# Patient Record
Sex: MALE | Race: BLACK OR AFRICAN AMERICAN | NOT HISPANIC OR LATINO | Employment: OTHER | URBAN - METROPOLITAN AREA
[De-identification: names, ages, dates, MRNs, and addresses within clinical notes are randomized per-mention and may not be internally consistent; named-entity substitution may affect disease eponyms.]

---

## 2017-02-23 PROBLEM — D63.8 ANEMIA OF CHRONIC DISEASE: Status: ACTIVE | Noted: 2017-02-23

## 2017-02-23 PROBLEM — I07.1 TRICUSPID REGURGITATION: Status: ACTIVE | Noted: 2017-02-23

## 2017-10-15 PROBLEM — D63.8 ANEMIA OF CHRONIC DISEASE: Chronic | Status: ACTIVE | Noted: 2017-02-23

## 2018-02-22 PROBLEM — Z87.442 HISTORY OF KIDNEY STONES: Status: ACTIVE | Noted: 2018-02-22

## 2018-07-25 ENCOUNTER — TELEPHONE (OUTPATIENT)
Dept: ADMINISTRATIVE | Facility: HOSPITAL | Age: 73
End: 2018-07-25

## 2019-05-14 PROBLEM — D69.6 THROMBOCYTOPENIA: Status: ACTIVE | Noted: 2019-05-14

## 2020-11-25 PROBLEM — I34.0 NON-RHEUMATIC MITRAL REGURGITATION: Status: ACTIVE | Noted: 2020-11-25

## 2020-11-25 PROBLEM — I27.20 PULMONARY HYPERTENSION: Status: ACTIVE | Noted: 2020-11-25

## 2020-11-25 PROBLEM — I36.1 NON-RHEUMATIC TRICUSPID VALVE INSUFFICIENCY: Status: ACTIVE | Noted: 2020-11-25

## 2020-11-25 PROBLEM — I07.1 TRICUSPID REGURGITATION: Status: RESOLVED | Noted: 2017-02-23 | Resolved: 2020-11-25

## 2021-05-12 PROBLEM — D61.818 PANCYTOPENIA: Status: ACTIVE | Noted: 2021-05-12

## 2021-08-07 ENCOUNTER — HOSPITAL ENCOUNTER (EMERGENCY)
Facility: HOSPITAL | Age: 76
Discharge: HOME | End: 2021-08-07
Attending: EMERGENCY MEDICINE
Payer: MEDICARE

## 2021-08-07 VITALS
RESPIRATION RATE: 18 BRPM | OXYGEN SATURATION: 98 % | BODY MASS INDEX: 19.8 KG/M2 | SYSTOLIC BLOOD PRESSURE: 138 MMHG | DIASTOLIC BLOOD PRESSURE: 69 MMHG | TEMPERATURE: 98 F | HEIGHT: 71 IN | HEART RATE: 67 BPM

## 2021-08-07 DIAGNOSIS — I10 ESSENTIAL HYPERTENSION: Primary | ICD-10-CM

## 2021-08-07 PROCEDURE — 99281 EMR DPT VST MAYX REQ PHY/QHP: CPT

## 2021-10-20 PROBLEM — N22 CALCULUS OF URINARY TRACT IN DISEASES CLASSIFIED ELSEWHERE: Status: ACTIVE | Noted: 2021-10-20

## 2021-10-29 ENCOUNTER — HOSPITAL ENCOUNTER (EMERGENCY)
Facility: HOSPITAL | Age: 76
Discharge: HOME | End: 2021-10-29
Attending: EMERGENCY MEDICINE
Payer: MEDICARE

## 2021-10-29 VITALS
HEART RATE: 70 BPM | OXYGEN SATURATION: 100 % | WEIGHT: 142 LBS | BODY MASS INDEX: 19.8 KG/M2 | TEMPERATURE: 98 F | SYSTOLIC BLOOD PRESSURE: 160 MMHG | DIASTOLIC BLOOD PRESSURE: 80 MMHG | RESPIRATION RATE: 18 BRPM

## 2021-10-29 DIAGNOSIS — N39.0 URINARY TRACT INFECTION WITH HEMATURIA, SITE UNSPECIFIED: ICD-10-CM

## 2021-10-29 DIAGNOSIS — Z96.0 URETERAL STENT RETAINED: Primary | ICD-10-CM

## 2021-10-29 DIAGNOSIS — R31.9 URINARY TRACT INFECTION WITH HEMATURIA, SITE UNSPECIFIED: ICD-10-CM

## 2021-10-29 DIAGNOSIS — N32.89 BLADDER SPASMS: ICD-10-CM

## 2021-10-29 LAB
BACTERIA #/AREA URNS HPF: NEGATIVE /HPF
BILIRUB UR QL STRIP: ABNORMAL
CLARITY UR: ABNORMAL
COLOR UR: ABNORMAL
GLUCOSE UR QL STRIP: NEGATIVE
HGB UR QL STRIP: ABNORMAL
HYALINE CASTS #/AREA URNS LPF: 4 /LPF
KETONES UR QL STRIP: NEGATIVE
LEUKOCYTE ESTERASE UR QL STRIP: ABNORMAL
MICROSCOPIC COMMENT: ABNORMAL
NITRITE UR QL STRIP: POSITIVE
PH UR STRIP: 5 [PH] (ref 5–8)
PROT UR QL STRIP: ABNORMAL
RBC #/AREA URNS HPF: >100 /HPF (ref 0–4)
SP GR UR STRIP: 1.01 (ref 1–1.03)
SQUAMOUS #/AREA URNS HPF: 2 /HPF
URN SPEC COLLECT METH UR: ABNORMAL
UROBILINOGEN UR STRIP-ACNC: NEGATIVE EU/DL
WBC #/AREA URNS HPF: 15 /HPF (ref 0–5)

## 2021-10-29 PROCEDURE — 81000 URINALYSIS NONAUTO W/SCOPE: CPT | Performed by: CLINICAL NURSE SPECIALIST

## 2021-10-29 PROCEDURE — 25000003 PHARM REV CODE 250: Performed by: CLINICAL NURSE SPECIALIST

## 2021-10-29 PROCEDURE — 96372 THER/PROPH/DIAG INJ SC/IM: CPT

## 2021-10-29 PROCEDURE — 99284 EMERGENCY DEPT VISIT MOD MDM: CPT | Mod: 25

## 2021-10-29 PROCEDURE — 63600175 PHARM REV CODE 636 W HCPCS: Performed by: CLINICAL NURSE SPECIALIST

## 2021-10-29 PROCEDURE — 87086 URINE CULTURE/COLONY COUNT: CPT | Performed by: CLINICAL NURSE SPECIALIST

## 2021-10-29 RX ORDER — LEVOFLOXACIN 500 MG/1
500 TABLET, FILM COATED ORAL DAILY
Qty: 7 TABLET | Refills: 0 | Status: SHIPPED | OUTPATIENT
Start: 2021-10-29 | End: 2021-11-05

## 2021-10-29 RX ORDER — PHENAZOPYRIDINE HYDROCHLORIDE 200 MG/1
200 TABLET, FILM COATED ORAL 3 TIMES DAILY
Qty: 30 TABLET | Refills: 0 | Status: SHIPPED | OUTPATIENT
Start: 2021-10-29 | End: 2021-11-08

## 2021-10-29 RX ORDER — HYDROCODONE BITARTRATE AND ACETAMINOPHEN 7.5; 325 MG/1; MG/1
1 TABLET ORAL EVERY 6 HOURS PRN
Qty: 10 TABLET | Refills: 0 | Status: SHIPPED | OUTPATIENT
Start: 2021-10-29 | End: 2022-03-29

## 2021-10-29 RX ORDER — OXYCODONE AND ACETAMINOPHEN 5; 325 MG/1; MG/1
1 TABLET ORAL
Status: COMPLETED | OUTPATIENT
Start: 2021-10-29 | End: 2021-10-29

## 2021-10-29 RX ORDER — KETOROLAC TROMETHAMINE 30 MG/ML
30 INJECTION, SOLUTION INTRAMUSCULAR; INTRAVENOUS
Status: COMPLETED | OUTPATIENT
Start: 2021-10-29 | End: 2021-10-29

## 2021-10-29 RX ADMIN — KETOROLAC TROMETHAMINE 30 MG: 30 INJECTION, SOLUTION INTRAMUSCULAR at 11:10

## 2021-10-29 RX ADMIN — OXYCODONE HYDROCHLORIDE AND ACETAMINOPHEN 1 TABLET: 5; 325 TABLET ORAL at 11:10

## 2021-10-31 LAB — BACTERIA UR CULT: NORMAL

## 2021-11-03 PROBLEM — E86.0 ACUTE DEHYDRATION: Status: ACTIVE | Noted: 2021-11-03

## 2022-03-29 PROBLEM — I77.810 DILATED AORTIC ROOT: Status: ACTIVE | Noted: 2022-03-29

## 2022-03-29 PROBLEM — Z91.199 NONCOMPLIANCE: Status: ACTIVE | Noted: 2022-03-29

## 2022-07-06 PROBLEM — I51.7 LEFT ATRIAL ENLARGEMENT: Status: ACTIVE | Noted: 2022-07-06

## 2022-07-06 PROBLEM — J98.4 PULMONARY INSUFFICIENCY: Status: ACTIVE | Noted: 2022-07-06

## 2022-07-06 PROBLEM — I51.7 RIGHT ATRIAL ENLARGEMENT: Status: ACTIVE | Noted: 2022-07-06

## 2022-07-21 PROBLEM — Z00.00 WELLNESS EXAMINATION: Status: ACTIVE | Noted: 2022-07-21

## 2022-07-21 PROBLEM — C61 PROSTATE CANCER: Status: ACTIVE | Noted: 2022-07-21

## 2022-07-21 PROBLEM — E55.9 VITAMIN D DEFICIENCY: Status: ACTIVE | Noted: 2022-07-21

## 2022-07-22 ENCOUNTER — LAB VISIT (OUTPATIENT)
Dept: LAB | Facility: HOSPITAL | Age: 77
End: 2022-07-22
Attending: INTERNAL MEDICINE
Payer: MEDICARE

## 2022-07-22 DIAGNOSIS — N18.2 CHRONIC KIDNEY DISEASE, STAGE II (MILD): ICD-10-CM

## 2022-07-22 LAB
ALBUMIN SERPL BCP-MCNC: 3.4 G/DL (ref 3.5–5.2)
ANION GAP SERPL CALC-SCNC: 1 MMOL/L (ref 8–16)
BASOPHILS # BLD AUTO: 0.03 K/UL (ref 0–0.2)
BASOPHILS NFR BLD: 0.7 % (ref 0–1.9)
BUN SERPL-MCNC: 17 MG/DL (ref 8–23)
CALCIUM SERPL-MCNC: 8.5 MG/DL (ref 8.7–10.5)
CHLORIDE SERPL-SCNC: 113 MMOL/L (ref 95–110)
CO2 SERPL-SCNC: 30 MMOL/L (ref 23–29)
CREAT SERPL-MCNC: 1.4 MG/DL (ref 0.5–1.4)
CREAT UR-MCNC: 128 MG/DL (ref 23–375)
DIFFERENTIAL METHOD: ABNORMAL
EOSINOPHIL # BLD AUTO: 0.2 K/UL (ref 0–0.5)
EOSINOPHIL NFR BLD: 4.2 % (ref 0–8)
ERYTHROCYTE [DISTWIDTH] IN BLOOD BY AUTOMATED COUNT: 16.7 % (ref 11.5–14.5)
EST. GFR  (AFRICAN AMERICAN): 56.4 ML/MIN/1.73 M^2
EST. GFR  (NON AFRICAN AMERICAN): 48.8 ML/MIN/1.73 M^2
GLUCOSE SERPL-MCNC: 114 MG/DL (ref 70–110)
HCT VFR BLD AUTO: 35.8 % (ref 40–54)
HGB BLD-MCNC: 11.5 G/DL (ref 14–18)
IMM GRANULOCYTES # BLD AUTO: 0 K/UL (ref 0–0.04)
IMM GRANULOCYTES NFR BLD AUTO: 0 % (ref 0–0.5)
LYMPHOCYTES # BLD AUTO: 1 K/UL (ref 1–4.8)
LYMPHOCYTES NFR BLD: 23.7 % (ref 18–48)
MCH RBC QN AUTO: 26.1 PG (ref 27–31)
MCHC RBC AUTO-ENTMCNC: 32.1 G/DL (ref 32–36)
MCV RBC AUTO: 81 FL (ref 82–98)
MONOCYTES # BLD AUTO: 0.5 K/UL (ref 0.3–1)
MONOCYTES NFR BLD: 11.6 % (ref 4–15)
NEUTROPHILS # BLD AUTO: 2.6 K/UL (ref 1.8–7.7)
NEUTROPHILS NFR BLD: 59.8 % (ref 38–73)
NRBC BLD-RTO: 0 /100 WBC
PHOSPHATE SERPL-MCNC: 2.7 MG/DL (ref 2.7–4.5)
PLATELET # BLD AUTO: 118 K/UL (ref 150–450)
PMV BLD AUTO: 10.7 FL (ref 9.2–12.9)
POTASSIUM SERPL-SCNC: 3.8 MMOL/L (ref 3.5–5.1)
PROT UR-MCNC: 27.8 MG/DL (ref 0–15)
PROT/CREAT UR: 0.22 MG/G{CREAT} (ref 0–0.2)
PTH-INTACT SERPL-MCNC: 96.8 PG/ML (ref 9–77)
RBC # BLD AUTO: 4.4 M/UL (ref 4.6–6.2)
SODIUM SERPL-SCNC: 144 MMOL/L (ref 136–145)
WBC # BLD AUTO: 4.31 K/UL (ref 3.9–12.7)

## 2022-07-22 PROCEDURE — 83970 ASSAY OF PARATHORMONE: CPT | Performed by: INTERNAL MEDICINE

## 2022-07-22 PROCEDURE — 84156 ASSAY OF PROTEIN URINE: CPT | Performed by: INTERNAL MEDICINE

## 2022-07-22 PROCEDURE — 85025 COMPLETE CBC W/AUTO DIFF WBC: CPT | Performed by: INTERNAL MEDICINE

## 2022-07-22 PROCEDURE — 80069 RENAL FUNCTION PANEL: CPT | Performed by: INTERNAL MEDICINE

## 2022-07-22 PROCEDURE — 36415 COLL VENOUS BLD VENIPUNCTURE: CPT | Performed by: INTERNAL MEDICINE

## 2022-10-24 PROBLEM — Z00.00 WELLNESS EXAMINATION: Status: RESOLVED | Noted: 2022-07-21 | Resolved: 2022-10-24

## 2022-10-27 ENCOUNTER — HOSPITAL ENCOUNTER (OUTPATIENT)
Dept: RADIOLOGY | Facility: HOSPITAL | Age: 77
Discharge: HOME | End: 2022-10-27
Attending: PHYSICIAN ASSISTANT
Payer: MEDICARE

## 2022-10-27 DIAGNOSIS — R32 URINARY INCONTINENCE, UNSPECIFIED TYPE: ICD-10-CM

## 2022-10-27 DIAGNOSIS — Z87.448 HISTORY OF HEMATURIA: ICD-10-CM

## 2022-10-27 DIAGNOSIS — N20.0 NEPHROLITHIASIS: ICD-10-CM

## 2022-10-27 PROCEDURE — 74176 CT ABD & PELVIS W/O CONTRAST: CPT | Mod: TC

## 2023-01-23 ENCOUNTER — HOSPITAL ENCOUNTER (OUTPATIENT)
Dept: RADIOLOGY | Facility: HOSPITAL | Age: 78
Discharge: HOME | End: 2023-01-23
Attending: INTERNAL MEDICINE
Payer: MEDICARE

## 2023-01-23 DIAGNOSIS — R93.3 ABNORMAL CT SCAN, COLON: ICD-10-CM

## 2023-01-23 PROCEDURE — 74177 CT ABD & PELVIS W/CONTRAST: CPT | Mod: TC

## 2023-01-23 PROCEDURE — 25500020 PHARM REV CODE 255: Performed by: INTERNAL MEDICINE

## 2023-01-23 RX ADMIN — IOHEXOL 100 ML: 350 INJECTION, SOLUTION INTRAVENOUS at 08:01

## 2023-01-25 ENCOUNTER — HOSPITAL ENCOUNTER (EMERGENCY)
Facility: HOSPITAL | Age: 78
Discharge: HOME | End: 2023-01-25
Attending: STUDENT IN AN ORGANIZED HEALTH CARE EDUCATION/TRAINING PROGRAM
Payer: MEDICARE

## 2023-01-25 VITALS
WEIGHT: 132 LBS | DIASTOLIC BLOOD PRESSURE: 66 MMHG | SYSTOLIC BLOOD PRESSURE: 157 MMHG | RESPIRATION RATE: 16 BRPM | BODY MASS INDEX: 18.48 KG/M2 | TEMPERATURE: 98 F | OXYGEN SATURATION: 98 % | HEIGHT: 71 IN | HEART RATE: 69 BPM

## 2023-01-25 DIAGNOSIS — I10 HYPERTENSION, UNSPECIFIED TYPE: Primary | ICD-10-CM

## 2023-01-25 PROBLEM — R04.0 BLEEDING FROM THE NOSE: Status: ACTIVE | Noted: 2023-01-25

## 2023-01-25 PROBLEM — E21.3 HYPERPARATHYROIDISM: Status: ACTIVE | Noted: 2023-01-25

## 2023-01-25 PROCEDURE — 96372 THER/PROPH/DIAG INJ SC/IM: CPT | Performed by: STUDENT IN AN ORGANIZED HEALTH CARE EDUCATION/TRAINING PROGRAM

## 2023-01-25 PROCEDURE — 99284 EMERGENCY DEPT VISIT MOD MDM: CPT

## 2023-01-25 PROCEDURE — 63600175 PHARM REV CODE 636 W HCPCS: Performed by: STUDENT IN AN ORGANIZED HEALTH CARE EDUCATION/TRAINING PROGRAM

## 2023-01-25 RX ORDER — LIDOCAINE HYDROCHLORIDE 20 MG/ML
15 SOLUTION OROPHARYNGEAL ONCE
Status: DISCONTINUED | OUTPATIENT
Start: 2023-01-25 | End: 2023-01-25 | Stop reason: HOSPADM

## 2023-01-25 RX ORDER — HYDRALAZINE HYDROCHLORIDE 20 MG/ML
10 INJECTION INTRAMUSCULAR; INTRAVENOUS
Status: COMPLETED | OUTPATIENT
Start: 2023-01-25 | End: 2023-01-25

## 2023-01-25 RX ORDER — MAG HYDROX/ALUMINUM HYD/SIMETH 200-200-20
30 SUSPENSION, ORAL (FINAL DOSE FORM) ORAL ONCE
Status: DISCONTINUED | OUTPATIENT
Start: 2023-01-25 | End: 2023-01-25 | Stop reason: HOSPADM

## 2023-01-25 RX ADMIN — HYDRALAZINE HYDROCHLORIDE 10 MG: 20 INJECTION, SOLUTION INTRAMUSCULAR; INTRAVENOUS at 05:01
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
## 2023-04-27 PROBLEM — N39.3 MALE STRESS INCONTINENCE: Status: ACTIVE | Noted: 2023-04-27

## 2023-04-27 PROBLEM — N39.46 MIXED STRESS AND URGE URINARY INCONTINENCE: Status: ACTIVE | Noted: 2023-04-27

## 2023-04-27 PROBLEM — Z87.448 HISTORY OF HEMATURIA: Status: ACTIVE | Noted: 2023-04-27

## 2023-04-29 ENCOUNTER — HOSPITAL ENCOUNTER (EMERGENCY)
Facility: HOSPITAL | Age: 78
Discharge: HOME | End: 2023-04-30
Attending: STUDENT IN AN ORGANIZED HEALTH CARE EDUCATION/TRAINING PROGRAM
Payer: MEDICARE

## 2023-04-29 VITALS
HEART RATE: 71 BPM | WEIGHT: 126 LBS | HEIGHT: 71 IN | TEMPERATURE: 98 F | SYSTOLIC BLOOD PRESSURE: 152 MMHG | BODY MASS INDEX: 17.64 KG/M2 | DIASTOLIC BLOOD PRESSURE: 73 MMHG | RESPIRATION RATE: 20 BRPM | OXYGEN SATURATION: 95 %

## 2023-04-29 DIAGNOSIS — R09.81 NASAL CONGESTION: Primary | ICD-10-CM

## 2023-04-29 PROCEDURE — 99284 EMERGENCY DEPT VISIT MOD MDM: CPT

## 2023-04-30 RX ORDER — AZELASTINE 1 MG/ML
2 SPRAY, METERED NASAL 2 TIMES DAILY
Qty: 30 ML | Refills: 0 | Status: SHIPPED | OUTPATIENT
Start: 2023-04-30 | End: 2024-02-27
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
## 2023-07-10 PROBLEM — R06.09 DYSPNEA ON EXERTION: Status: ACTIVE | Noted: 2023-07-10

## 2023-07-27 ENCOUNTER — LAB VISIT (OUTPATIENT)
Dept: LAB | Facility: HOSPITAL | Age: 78
End: 2023-07-27
Attending: INTERNAL MEDICINE
Payer: MEDICARE

## 2023-07-27 DIAGNOSIS — N18.31 STAGE 3A CHRONIC KIDNEY DISEASE: ICD-10-CM

## 2023-07-27 DIAGNOSIS — D63.8 ANEMIA OF CHRONIC DISEASE: ICD-10-CM

## 2023-07-27 DIAGNOSIS — D75.89 BICYTOPENIA: ICD-10-CM

## 2023-07-27 DIAGNOSIS — D69.6 THROMBOCYTOPENIA: ICD-10-CM

## 2023-07-27 DIAGNOSIS — Z92.3 S/P RADIATION THERAPY > 12 WKS AGO: ICD-10-CM

## 2023-07-27 DIAGNOSIS — Z85.46 HISTORY OF PROSTATE CANCER: ICD-10-CM

## 2023-07-27 LAB
ALBUMIN SERPL BCP-MCNC: 3.4 G/DL (ref 3.5–5.2)
ALBUMIN SERPL BCP-MCNC: 3.4 G/DL (ref 3.5–5.2)
ALP SERPL-CCNC: 73 U/L (ref 55–135)
ALT SERPL W/O P-5'-P-CCNC: 12 U/L (ref 10–44)
ANION GAP SERPL CALC-SCNC: 3 MMOL/L (ref 8–16)
ANION GAP SERPL CALC-SCNC: 3 MMOL/L (ref 8–16)
AST SERPL-CCNC: 12 U/L (ref 10–40)
BASOPHILS # BLD AUTO: 0.02 K/UL (ref 0–0.2)
BASOPHILS NFR BLD: 0.5 % (ref 0–1.9)
BILIRUB SERPL-MCNC: 0.5 MG/DL (ref 0.1–1)
BUN SERPL-MCNC: 23 MG/DL (ref 8–23)
BUN SERPL-MCNC: 23 MG/DL (ref 8–23)
CALCIUM SERPL-MCNC: 8.7 MG/DL (ref 8.7–10.5)
CALCIUM SERPL-MCNC: 8.7 MG/DL (ref 8.7–10.5)
CHLORIDE SERPL-SCNC: 113 MMOL/L (ref 95–110)
CHLORIDE SERPL-SCNC: 113 MMOL/L (ref 95–110)
CO2 SERPL-SCNC: 27 MMOL/L (ref 23–29)
CO2 SERPL-SCNC: 27 MMOL/L (ref 23–29)
CREAT SERPL-MCNC: 1.2 MG/DL (ref 0.5–1.4)
CREAT SERPL-MCNC: 1.2 MG/DL (ref 0.5–1.4)
DIFFERENTIAL METHOD: ABNORMAL
EOSINOPHIL # BLD AUTO: 0.1 K/UL (ref 0–0.5)
EOSINOPHIL NFR BLD: 3.2 % (ref 0–8)
ERYTHROCYTE [DISTWIDTH] IN BLOOD BY AUTOMATED COUNT: 15.1 % (ref 11.5–14.5)
EST. GFR  (NO RACE VARIABLE): >60 ML/MIN/1.73 M^2
EST. GFR  (NO RACE VARIABLE): >60 ML/MIN/1.73 M^2
GLUCOSE SERPL-MCNC: 86 MG/DL (ref 70–110)
GLUCOSE SERPL-MCNC: 86 MG/DL (ref 70–110)
HCT VFR BLD AUTO: 36.8 % (ref 40–54)
HGB BLD-MCNC: 11.6 G/DL (ref 14–18)
IMM GRANULOCYTES # BLD AUTO: 0 K/UL (ref 0–0.04)
IMM GRANULOCYTES NFR BLD AUTO: 0 % (ref 0–0.5)
LYMPHOCYTES # BLD AUTO: 0.8 K/UL (ref 1–4.8)
LYMPHOCYTES NFR BLD: 21.9 % (ref 18–48)
MCH RBC QN AUTO: 26 PG (ref 27–31)
MCHC RBC AUTO-ENTMCNC: 31.5 G/DL (ref 32–36)
MCV RBC AUTO: 83 FL (ref 82–98)
MONOCYTES # BLD AUTO: 0.4 K/UL (ref 0.3–1)
MONOCYTES NFR BLD: 10.6 % (ref 4–15)
NEUTROPHILS # BLD AUTO: 2.4 K/UL (ref 1.8–7.7)
NEUTROPHILS NFR BLD: 63.8 % (ref 38–73)
NRBC BLD-RTO: 0 /100 WBC
PHOSPHATE SERPL-MCNC: 3 MG/DL (ref 2.7–4.5)
PLATELET # BLD AUTO: 125 K/UL (ref 150–450)
PMV BLD AUTO: 11.2 FL (ref 9.2–12.9)
POTASSIUM SERPL-SCNC: 3.8 MMOL/L (ref 3.5–5.1)
POTASSIUM SERPL-SCNC: 3.8 MMOL/L (ref 3.5–5.1)
PROT SERPL-MCNC: 7.8 G/DL (ref 6–8.4)
RBC # BLD AUTO: 4.46 M/UL (ref 4.6–6.2)
SODIUM SERPL-SCNC: 143 MMOL/L (ref 136–145)
SODIUM SERPL-SCNC: 143 MMOL/L (ref 136–145)
WBC # BLD AUTO: 3.79 K/UL (ref 3.9–12.7)

## 2023-07-27 PROCEDURE — 85025 COMPLETE CBC W/AUTO DIFF WBC: CPT | Performed by: INTERNAL MEDICINE

## 2023-07-27 PROCEDURE — 82570 ASSAY OF URINE CREATININE: CPT | Performed by: INTERNAL MEDICINE

## 2023-07-27 PROCEDURE — 80053 COMPREHEN METABOLIC PANEL: CPT | Performed by: NURSE PRACTITIONER

## 2023-07-27 PROCEDURE — 84100 ASSAY OF PHOSPHORUS: CPT | Performed by: INTERNAL MEDICINE

## 2023-07-28 LAB
CREAT UR-MCNC: 99.4 MG/DL (ref 23–375)
PROT UR-MCNC: 14.1 MG/DL (ref 0–15)
PROT/CREAT UR: 0.14 MG/G{CREAT} (ref 0–0.2)

## 2023-11-27 PROBLEM — Z00.00 WELLNESS EXAMINATION: Status: RESOLVED | Noted: 2022-07-21 | Resolved: 2023-11-27

## 2024-02-20 ENCOUNTER — HOSPITAL ENCOUNTER (EMERGENCY)
Facility: HOSPITAL | Age: 79
Discharge: HOME | End: 2024-02-20
Attending: INTERNAL MEDICINE
Payer: MEDICARE

## 2024-02-20 VITALS
WEIGHT: 130.19 LBS | HEIGHT: 71 IN | HEART RATE: 84 BPM | TEMPERATURE: 98 F | DIASTOLIC BLOOD PRESSURE: 71 MMHG | BODY MASS INDEX: 18.23 KG/M2 | OXYGEN SATURATION: 95 % | RESPIRATION RATE: 18 BRPM | SYSTOLIC BLOOD PRESSURE: 153 MMHG

## 2024-02-20 DIAGNOSIS — U07.1 COVID-19 VIRUS DETECTED: ICD-10-CM

## 2024-02-20 DIAGNOSIS — U07.1 COVID-19: Primary | ICD-10-CM

## 2024-02-20 LAB
CTP QC/QA: YES
CTP QC/QA: YES
POC MOLECULAR INFLUENZA A AGN: NEGATIVE
POC MOLECULAR INFLUENZA B AGN: NEGATIVE
SARS-COV-2 RDRP RESP QL NAA+PROBE: POSITIVE

## 2024-02-20 PROCEDURE — 87502 INFLUENZA DNA AMP PROBE: CPT

## 2024-02-20 PROCEDURE — 87635 SARS-COV-2 COVID-19 AMP PRB: CPT | Performed by: INTERNAL MEDICINE

## 2024-02-20 PROCEDURE — 99282 EMERGENCY DEPT VISIT SF MDM: CPT
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
## 2024-02-27 PROBLEM — I25.10 CAD (CORONARY ARTERY DISEASE): Status: ACTIVE | Noted: 2024-02-27

## 2024-06-03 PROBLEM — Z00.00 WELLNESS EXAMINATION: Status: RESOLVED | Noted: 2022-07-21 | Resolved: 2024-06-03

## 2024-07-10 PROBLEM — I45.10 RIGHT BUNDLE BRANCH BLOCK (RBBB): Status: ACTIVE | Noted: 2024-07-10

## 2024-07-10 PROBLEM — Z91.89 MULTIPLE RISK FACTORS FOR CORONARY ARTERY DISEASE: Status: ACTIVE | Noted: 2024-07-10

## 2024-07-10 PROBLEM — J98.4 PULMONARY INSUFFICIENCY: Status: RESOLVED | Noted: 2022-07-06 | Resolved: 2024-07-10

## 2024-07-10 PROBLEM — I10 PRIMARY HYPERTENSION: Status: ACTIVE | Noted: 2024-07-10

## 2024-07-10 PROBLEM — I37.1 NONRHEUMATIC PULMONARY VALVE INSUFFICIENCY: Status: ACTIVE | Noted: 2024-07-10

## 2024-07-31 ENCOUNTER — LAB VISIT (OUTPATIENT)
Dept: LAB | Facility: HOSPITAL | Age: 79
End: 2024-07-31
Attending: INTERNAL MEDICINE
Payer: MEDICARE

## 2024-07-31 DIAGNOSIS — I77.810 DILATED AORTIC ROOT: ICD-10-CM

## 2024-07-31 DIAGNOSIS — I37.1 NONRHEUMATIC PULMONARY VALVE INSUFFICIENCY: ICD-10-CM

## 2024-07-31 DIAGNOSIS — I10 PRIMARY HYPERTENSION: ICD-10-CM

## 2024-07-31 DIAGNOSIS — I73.1 BUERGER'S DISEASE: ICD-10-CM

## 2024-07-31 DIAGNOSIS — I51.7 RIGHT ATRIAL ENLARGEMENT: ICD-10-CM

## 2024-07-31 DIAGNOSIS — R06.09 DYSPNEA ON EXERTION: ICD-10-CM

## 2024-07-31 DIAGNOSIS — I51.7 LEFT ATRIAL ENLARGEMENT: ICD-10-CM

## 2024-07-31 DIAGNOSIS — I34.0 NON-RHEUMATIC MITRAL REGURGITATION: ICD-10-CM

## 2024-07-31 DIAGNOSIS — Z91.89 MULTIPLE RISK FACTORS FOR CORONARY ARTERY DISEASE: ICD-10-CM

## 2024-07-31 DIAGNOSIS — I27.20 PULMONARY HYPERTENSION: ICD-10-CM

## 2024-07-31 DIAGNOSIS — E78.5 HYPERLIPIDEMIA, UNSPECIFIED HYPERLIPIDEMIA TYPE: Chronic | ICD-10-CM

## 2024-07-31 DIAGNOSIS — E11.9 TYPE 2 DIABETES MELLITUS WITHOUT COMPLICATION, WITHOUT LONG-TERM CURRENT USE OF INSULIN: ICD-10-CM

## 2024-07-31 DIAGNOSIS — I42.0 DILATED CARDIOMYOPATHY: ICD-10-CM

## 2024-07-31 DIAGNOSIS — I36.1 NON-RHEUMATIC TRICUSPID VALVE INSUFFICIENCY: ICD-10-CM

## 2024-07-31 DIAGNOSIS — I73.9 PAD (PERIPHERAL ARTERY DISEASE): ICD-10-CM

## 2024-07-31 DIAGNOSIS — I35.1 NONRHEUMATIC AORTIC VALVE INSUFFICIENCY: ICD-10-CM

## 2024-07-31 DIAGNOSIS — I25.10 CORONARY ARTERY DISEASE INVOLVING NATIVE CORONARY ARTERY OF NATIVE HEART WITHOUT ANGINA PECTORIS: ICD-10-CM

## 2024-07-31 DIAGNOSIS — Z87.891 HISTORY OF TOBACCO ABUSE: ICD-10-CM

## 2024-07-31 LAB
ALBUMIN SERPL BCP-MCNC: 3.3 G/DL (ref 3.5–5.2)
ALP SERPL-CCNC: 65 U/L (ref 55–135)
ALT SERPL W/O P-5'-P-CCNC: 10 U/L (ref 10–44)
ANION GAP SERPL CALC-SCNC: 10 MMOL/L (ref 3–11)
AST SERPL-CCNC: 14 U/L (ref 10–40)
BILIRUB SERPL-MCNC: 0.6 MG/DL (ref 0.1–1)
BUN SERPL-MCNC: 20 MG/DL (ref 8–23)
CALCIUM SERPL-MCNC: 8.7 MG/DL (ref 8.7–10.5)
CHLORIDE SERPL-SCNC: 108 MMOL/L (ref 95–110)
CHOLEST SERPL-MCNC: 156 MG/DL (ref 120–199)
CHOLEST/HDLC SERPL: 2.1 {RATIO} (ref 2–5)
CO2 SERPL-SCNC: 27 MMOL/L (ref 23–29)
CREAT SERPL-MCNC: 1.2 MG/DL (ref 0.5–1.4)
EST. GFR  (NO RACE VARIABLE): >60 ML/MIN/1.73 M^2
GLUCOSE SERPL-MCNC: 87 MG/DL (ref 70–110)
HDLC SERPL-MCNC: 74 MG/DL (ref 40–75)
HDLC SERPL: 47.4 % (ref 20–50)
LDLC SERPL CALC-MCNC: 69.4 MG/DL (ref 63–159)
MAGNESIUM SERPL-MCNC: 1.7 MG/DL (ref 1.6–2.6)
NONHDLC SERPL-MCNC: 82 MG/DL
POTASSIUM SERPL-SCNC: 3.6 MMOL/L (ref 3.5–5.1)
PROT SERPL-MCNC: 7.8 G/DL (ref 6–8.4)
SODIUM SERPL-SCNC: 145 MMOL/L (ref 136–145)
TRIGL SERPL-MCNC: 63 MG/DL (ref 30–150)

## 2024-07-31 PROCEDURE — 80053 COMPREHEN METABOLIC PANEL: CPT | Performed by: INTERNAL MEDICINE

## 2024-07-31 PROCEDURE — 83735 ASSAY OF MAGNESIUM: CPT | Performed by: INTERNAL MEDICINE

## 2024-07-31 PROCEDURE — 80061 LIPID PANEL: CPT | Performed by: INTERNAL MEDICINE

## 2024-07-31 PROCEDURE — 36415 COLL VENOUS BLD VENIPUNCTURE: CPT | Performed by: INTERNAL MEDICINE

## 2024-09-05 ENCOUNTER — LAB VISIT (OUTPATIENT)
Dept: LAB | Facility: HOSPITAL | Age: 79
End: 2024-09-05
Attending: INTERNAL MEDICINE
Payer: MEDICARE

## 2024-09-05 DIAGNOSIS — E11.9 TYPE 2 DIABETES MELLITUS WITHOUT COMPLICATION, WITHOUT LONG-TERM CURRENT USE OF INSULIN: ICD-10-CM

## 2024-09-05 DIAGNOSIS — Z00.00 WELLNESS EXAMINATION: ICD-10-CM

## 2024-09-05 LAB
BASOPHILS # BLD AUTO: 0.02 K/UL (ref 0–0.2)
BASOPHILS NFR BLD: 0.6 % (ref 0–1.9)
DIFFERENTIAL METHOD BLD: ABNORMAL
EOSINOPHIL # BLD AUTO: 0.2 K/UL (ref 0–0.5)
EOSINOPHIL NFR BLD: 6 % (ref 0–8)
ERYTHROCYTE [DISTWIDTH] IN BLOOD BY AUTOMATED COUNT: 15 % (ref 11.5–14.5)
HCT VFR BLD AUTO: 36.4 % (ref 40–54)
HGB BLD-MCNC: 11.5 G/DL (ref 14–18)
IMM GRANULOCYTES # BLD AUTO: 0.01 K/UL (ref 0–0.04)
IMM GRANULOCYTES NFR BLD AUTO: 0.3 % (ref 0–0.5)
LYMPHOCYTES # BLD AUTO: 0.9 K/UL (ref 1–4.8)
LYMPHOCYTES NFR BLD: 27.8 % (ref 18–48)
MCH RBC QN AUTO: 26.7 PG (ref 27–31)
MCHC RBC AUTO-ENTMCNC: 31.6 G/DL (ref 32–36)
MCV RBC AUTO: 85 FL (ref 82–98)
MONOCYTES # BLD AUTO: 0.4 K/UL (ref 0.3–1)
MONOCYTES NFR BLD: 12.6 % (ref 4–15)
NEUTROPHILS # BLD AUTO: 1.8 K/UL (ref 1.8–7.7)
NEUTROPHILS NFR BLD: 52.7 % (ref 38–73)
NRBC BLD-RTO: 0 /100 WBC
PLATELET # BLD AUTO: 132 K/UL (ref 150–450)
PMV BLD AUTO: 11.1 FL (ref 9.2–12.9)
RBC # BLD AUTO: 4.31 M/UL (ref 4.6–6.2)
TSH SERPL DL<=0.005 MIU/L-ACNC: 1.34 UIU/ML (ref 0.4–4)
WBC # BLD AUTO: 3.34 K/UL (ref 3.9–12.7)

## 2024-09-05 PROCEDURE — 84443 ASSAY THYROID STIM HORMONE: CPT | Performed by: INTERNAL MEDICINE

## 2024-09-05 PROCEDURE — 36415 COLL VENOUS BLD VENIPUNCTURE: CPT | Performed by: INTERNAL MEDICINE

## 2024-09-05 PROCEDURE — 85025 COMPLETE CBC W/AUTO DIFF WBC: CPT | Performed by: INTERNAL MEDICINE

## 2024-09-09 ENCOUNTER — LAB VISIT (OUTPATIENT)
Dept: LAB | Facility: HOSPITAL | Age: 79
End: 2024-09-09
Attending: INTERNAL MEDICINE
Payer: MEDICARE

## 2024-09-09 DIAGNOSIS — Z00.00 WELLNESS EXAMINATION: ICD-10-CM

## 2024-09-09 DIAGNOSIS — E11.9 TYPE 2 DIABETES MELLITUS WITHOUT COMPLICATION, WITHOUT LONG-TERM CURRENT USE OF INSULIN: ICD-10-CM

## 2024-09-09 LAB
ALBUMIN/CREAT UR: 38.2 UG/MG (ref 0–30)
CREAT UR-MCNC: 201 MG/DL (ref 23–375)
MICROALBUMIN UR DL<=1MG/L-MCNC: 76.8 MG/L

## 2024-09-09 PROCEDURE — 82043 UR ALBUMIN QUANTITATIVE: CPT | Performed by: INTERNAL MEDICINE

## 2024-09-09 PROCEDURE — 82570 ASSAY OF URINE CREATININE: CPT | Performed by: INTERNAL MEDICINE

## 2025-03-27 ENCOUNTER — CLINICAL SUPPORT (OUTPATIENT)
Dept: REHABILITATION | Facility: HOSPITAL | Age: 80
End: 2025-03-27
Payer: MEDICARE

## 2025-03-27 DIAGNOSIS — R27.8 COORDINATION ABNORMAL: Primary | ICD-10-CM

## 2025-03-27 DIAGNOSIS — N39.3 SUI (STRESS URINARY INCONTINENCE), MALE: ICD-10-CM

## 2025-03-27 DIAGNOSIS — R53.1 WEAKNESS: ICD-10-CM

## 2025-03-27 PROCEDURE — 97162 PT EVAL MOD COMPLEX 30 MIN: CPT | Mod: PN | Performed by: PHYSICAL THERAPIST

## 2025-03-27 PROCEDURE — 97530 THERAPEUTIC ACTIVITIES: CPT | Mod: PN | Performed by: PHYSICAL THERAPIST

## 2025-03-27 NOTE — PATIENT INSTRUCTIONS
Your body needs about 63oz of water a day - 8 small bottles a day   - more if you are sweating  - spread it out during the day   - increase your water intake     Bladder irritants:   - carbonated beverages, acidic beverages, artifical sweeteners  - ok in moderation  - pay attention to a pattern     Pay attention to abdominal pressure when you are active  - if you feel your belly push out when you are lifting, pushing, coughing then you are pushing the urine out as well   - to correct this you want to gently pull your belly to your back like you are zipping a tight pair of pants
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
## 2025-04-02 ENCOUNTER — CLINICAL SUPPORT (OUTPATIENT)
Dept: REHABILITATION | Facility: HOSPITAL | Age: 80
End: 2025-04-02
Payer: MEDICARE

## 2025-04-02 DIAGNOSIS — N39.3 SUI (STRESS URINARY INCONTINENCE), MALE: Primary | ICD-10-CM

## 2025-04-02 DIAGNOSIS — R27.8 COORDINATION ABNORMAL: ICD-10-CM

## 2025-04-02 DIAGNOSIS — R53.1 WEAKNESS: ICD-10-CM

## 2025-04-02 PROCEDURE — 97112 NEUROMUSCULAR REEDUCATION: CPT | Mod: PN | Performed by: PHYSICAL THERAPIST

## 2025-04-02 NOTE — PATIENT INSTRUCTIONS
Do not hold tension in your belly all day long    DIAPHRAGMATIC BREATHING     The diaphragm is a dome shaped muscle that forms the floor of the rib cage. It is the most efficient muscle for breathing and relaxation, although most people are not used to using the diaphragm. Diaphragmatic or belly breathing is an important technique to learn because it helps settle down or relax the autonomic nervous system. The correct use of diaphragmatic breathing can help to quiet brain activity resulting in the relaxation of all the muscles and organs of the body. This is accomplished by slow rhythmic breathing concentrated in the diaphragm muscle rather than the chest.    How to do proper relaxation breathing:    Start by lying on your back or reclining in a chair in a relaxed position. Place one hand on your chest and the other on your abdomen.  Relax your jaw by placing your tongue on the floor of your mouth and keeping your teeth slightly apart.   Take a deep breath in, letting the abdomen expand and rise while you keep your upper chest, neck and shoulders relaxed.   As you breathe out, allow your abdomen and chest to fall. Exhale completely.  It doesn't matter if you breathe in/out through your nose and/or mouth. Do whichever feels comfortable.  Remember to breathe slowly.  Do not force your breathing. Do not hold your breath.  Repeat for 5 minutes every day.         Pelvic floor relaxation: gentle feeling of down and out   - occurs when urination, bowel movement,or passing gas  - every hour - tune into your pelvic floor and relax   - do not hold tension in the hips  - with breathing - let the diaphragm descend and the pelvic floor descend       Pelvic floor contraction:   Gentle feeling of up and in   Close your box  Do not hold your breath   Inhale, exhale, gently bring your belly button to your back, then pelvic floor up and in   Hold for 10 seconds  NO one should know what you are doing   It is not hard like someone is  about to hit you in the stomach     Core:  Abdominal strength:   - inhale - open the lower ribs  - exhale - draw the lower ribs inward  - do not hold your breath - no one should know what you are doing   - 3x10, 10 sec hold   - use this with function  - close your box before: lifting, pushing, pulling, coughing, getting in/out of the bed, getting in/out of the car  - and - if you leak urine - stop and try to feel what you did and work on correcting it the next time   - like you are zipping a tight pair of pants  - shrink wrap the intestines

## 2025-04-02 NOTE — PROGRESS NOTES
Outpatient Rehab    Physical Therapy Visit    Patient Name: Hola Wiley  MRN: [id number]  YOB: 1945  Encounter Date: 4/2/2025    Therapy Diagnosis:   Encounter Diagnoses   Name Primary?    ROGE (stress urinary incontinence), male Yes    Weakness     Coordination abnormal      Physician: Trever Langley II, MD    Physician Orders: Eval and Treat  Medical Diagnosis: ROGE (stress urinary incontinence), male    Visit # 2/12 Visits Authorized:  1 / 10  Insurance Authorization Period: 3/21/2025 to 12/31/2025  Date of Evaluation: 3/27/2025  Plan of Care Certification: 3/27/2025 to 6/19/2025      PT/PTA:     Number of PTA visits since last PT visit:   Time In: 1300   Time Out: 1348  Total Time: 48   Total Billable Time:  48    FOTO:  Intake Score:  %  Survey Score 1:  %  Survey Score 2:  %         Subjective   No new questions. Has noticed his core. His abodominal surgery was for a stomach ulcers over 2 years ago. I brought his daughter into the session to assist with carrover..  Family / care giver present for this visit:   Pain reported as 0/10.      Objective   Pelvic External Observations  Consent for Examination: Yes, Chaperone Present: No    Pelvic Assessment  Pelvic Scarring: No  Perineal Body Resting Position: Normal  Perineal Descent Bearing Down: Present  Volitional Contraction: Absent  Volitional Relaxation: Present  Volitional Bearing Down: Present          Pelvic Floor Palpation       Right External Pelvic Floor and Rectal Palpation  Abnormal: Layer 1  Right Pelvic  1 External Palpation Observations: bearing down palpated with repeated attempts to contract       Left External Pelvic Floor and Rectal Palpation  Abnormal: Layer 1  Left Pelvic  1 External Palpation Observations: bearing down palpated with repeated attempts to contract                              Treatment:  Balance/Neuromuscular Re-Education  NMR 1: RUSI to the core for TA assessment  NMR 2: Palpation of  the perieum with generation of IAP with attempts at contraction  NMR 3: Education on management of IAP and gentle TA contraction    Time Entry(in minutes):  Neuromuscular Re-Education Time Entry: 48    Assessment & Plan   Assessment:    Evaluation/Treatment Tolerance: Patient tolerated treatment well    Patient will continue to benefit from skilled outpatient physical therapy to address the deficits listed in the problem list box on initial evaluation, provide pt/family education and to maximize pt's level of independence in the home and community environment.     Patient's spiritual, cultural, and educational needs considered and patient agreeable to plan of care and goals.           Plan: Perineal RUSI to facilitate proper pf contraction.    Goals:   Active       Long term goals:       Patient will deny urinary incontinence.        Start:  03/28/25    Expected End:  06/19/25            Patient will be independent with progressive strength training.        Start:  03/28/25    Expected End:  06/19/25            Patient will report improved tolerance for physical activities of choice due to improved bladder control.        Start:  03/28/25    Expected End:  06/19/25               Short term goals:       Patient will be independent with proper core activation.        Start:  03/28/25    Expected End:  06/19/25            Patient will be independent with proper core mechanics with transfers.        Start:  03/28/25    Expected End:  06/19/25            Patient will report using less than or equal to 2 pads per day due to improved bladder control.        Start:  03/28/25    Expected End:  06/19/25                Gabino Tomas, PT

## 2025-04-09 ENCOUNTER — CLINICAL SUPPORT (OUTPATIENT)
Dept: REHABILITATION | Facility: HOSPITAL | Age: 80
End: 2025-04-09
Payer: MEDICARE

## 2025-04-09 DIAGNOSIS — R27.8 COORDINATION ABNORMAL: ICD-10-CM

## 2025-04-09 DIAGNOSIS — N39.3 SUI (STRESS URINARY INCONTINENCE), MALE: Primary | ICD-10-CM

## 2025-04-09 DIAGNOSIS — R53.1 WEAKNESS: ICD-10-CM

## 2025-04-09 PROCEDURE — 97530 THERAPEUTIC ACTIVITIES: CPT | Mod: PN | Performed by: PHYSICAL THERAPIST

## 2025-04-09 PROCEDURE — 97140 MANUAL THERAPY 1/> REGIONS: CPT | Mod: PN | Performed by: PHYSICAL THERAPIST

## 2025-04-09 NOTE — PATIENT INSTRUCTIONS
The farther from you an object is, the heavier it will be - may cause more leakage  - with lifting, keep the load close to you     When doing your exercises, keep your heel in contact with the bed to make your leg feel lighter.

## 2025-04-09 NOTE — PROGRESS NOTES
Outpatient Rehab    Physical Therapy Visit    Patient Name: Hola Wiley  MRN: [id number]  YOB: 1945  Encounter Date: 4/9/2025    Therapy Diagnosis:   Encounter Diagnoses   Name Primary?    ROGE (stress urinary incontinence), male Yes    Weakness     Coordination abnormal      Physician: Trever Langley II, MD    Physician Orders: Eval and Treat  Medical Diagnosis: ROGE (stress urinary incontinence), male    Visit # 3/12 Visits Authorized:  2 / 10  Insurance Authorization Period: 3/21/2025 to 12/31/2025  Date of Evaluation: 3/27/2025  Plan of Care Certification: 3/27/2025 to 6/19/2025      PT/PTA:     Number of PTA visits since last PT visit:   Time In: 1304   Time Out: 1345  Total Time: 41   Total Billable Time:  41    FOTO:  Intake Score:  %  Survey Score 1:  %  Survey Score 2:  %         Subjective   Had some oreos and Sprite the other night late and felt like he was up a lot to urinate that night. Got up almost every 30 min. Not leaking as much. Feels like he is able to hold in his core. 2-3 pads a day..    Pain reported as 0/10.      Objective                Treatment:  Manual Therapy  MT 1: abdominal scar massage  Therapeutic Activity  TA 3: Educated on and provided core HEP. Encouraged TA awareness with function.    Time Entry(in minutes):  Manual Therapy Time Entry: 10  Therapeutic Activity Time Entry: 31    Assessment & Plan   Assessment: Still with increased IAP with attempted TA contraction. Bladder control is slightly improved.       Patient will continue to benefit from skilled outpatient physical therapy to address the deficits listed in the problem list box on initial evaluation, provide pt/family education and to maximize pt's level of independence in the home and community environment.     Patient's spiritual, cultural, and educational needs considered and patient agreeable to plan of care and goals.           Plan: Monitor core awareness and bladder control.    Goals:   Active        Long term goals:       Patient will deny urinary incontinence.        Start:  03/28/25    Expected End:  06/19/25            Patient will be independent with progressive strength training.        Start:  03/28/25    Expected End:  06/19/25            Patient will report improved tolerance for physical activities of choice due to improved bladder control.        Start:  03/28/25    Expected End:  06/19/25               Short term goals:       Patient will be independent with proper core activation.        Start:  03/28/25    Expected End:  06/19/25            Patient will be independent with proper core mechanics with transfers.        Start:  03/28/25    Expected End:  06/19/25            Patient will report using less than or equal to 2 pads per day due to improved bladder control.        Start:  03/28/25    Expected End:  06/19/25                Gabino Tomas, PT

## 2025-04-16 ENCOUNTER — CLINICAL SUPPORT (OUTPATIENT)
Dept: REHABILITATION | Facility: HOSPITAL | Age: 80
End: 2025-04-16
Payer: MEDICARE

## 2025-04-16 DIAGNOSIS — R53.1 WEAKNESS: ICD-10-CM

## 2025-04-16 DIAGNOSIS — N39.3 SUI (STRESS URINARY INCONTINENCE), MALE: Primary | ICD-10-CM

## 2025-04-16 DIAGNOSIS — R27.8 COORDINATION ABNORMAL: ICD-10-CM

## 2025-04-16 PROCEDURE — 97112 NEUROMUSCULAR REEDUCATION: CPT | Mod: PN | Performed by: PHYSICAL THERAPIST

## 2025-04-16 NOTE — PROGRESS NOTES
Outpatient Rehab    Physical Therapy Visit    Patient Name: Hola Wiley  MRN: [id number]  YOB: 1945  Encounter Date: 4/16/2025    Therapy Diagnosis:   Encounter Diagnoses   Name Primary?    ROGE (stress urinary incontinence), male Yes    Weakness     Coordination abnormal      Physician: Trever Langley II, MD    Physician Orders: Eval and Treat  Medical Diagnosis: ROGE (stress urinary incontinence), male    Visit # 4/12 Visits Authorized:  3 / 10  Insurance Authorization Period: 3/21/2025 to 12/31/2025  Date of Evaluation: 3/27/2025  Plan of Care Certification: 3/27/2025 to 6/19/2025      PT/PTA:     Number of PTA visits since last PT visit:   Time In: 1304   Time Out: 1337  Total Time: 33   Total Billable Time:  33    FOTO:  Intake Score:  %  Survey Score 1:  %  Survey Score 2:  %         Subjective   He has decreased his sprite, stopped eating late at night. His nocturia has improved from 3 times to 2 times. Waking up dryer in the morning. Has also woke up dry. Feels like the sprite was making things worse. Still wtih 2-3 pads a day, worse with increased activity. Has been compliant with HEP..    Pain reported as 0/10.      Objective                Treatment:  Balance/Neuromuscular Re-Education  NMR 4: Perineal RUSI with cues for core and PF awareness. Encouraged core and pf contraction with ADLs.    Time Entry(in minutes):  Neuromuscular Re-Education Time Entry: 33    Assessment & Plan   Assessment:    Evaluation/Treatment Tolerance: Patient tolerated treatment well    Patient will continue to benefit from skilled outpatient physical therapy to address the deficits listed in the problem list box on initial evaluation, provide pt/family education and to maximize pt's level of independence in the home and community environment.     Patient's spiritual, cultural, and educational needs considered and patient agreeable to plan of care and goals.           Plan: Monitor core awareness and bladder  control.    Goals:   Active       Long term goals:       Patient will deny urinary incontinence.        Start:  03/28/25    Expected End:  06/19/25            Patient will be independent with progressive strength training.        Start:  03/28/25    Expected End:  06/19/25            Patient will report improved tolerance for physical activities of choice due to improved bladder control.        Start:  03/28/25    Expected End:  06/19/25               Short term goals:       Patient will be independent with proper core activation.        Start:  03/28/25    Expected End:  06/19/25            Patient will be independent with proper core mechanics with transfers.        Start:  03/28/25    Expected End:  06/19/25            Patient will report using less than or equal to 2 pads per day due to improved bladder control.        Start:  03/28/25    Expected End:  06/19/25                Gabino Tomas, PT

## 2025-04-16 NOTE — PATIENT INSTRUCTIONS
Kegels:   5 times, hold for 5 secs, 5 times     Pelvic floor contraction: Kegel   Gentle feeling of up and in   Close your box  Do not hold your breath   Inhale, exhale, gently bring your belly button to your back, then pelvic floor up and in   NO one should know what you are doing   Work smarter not harder    Use this feeling with function - pushing/pulling, lifting, getting in/out bed

## 2025-04-23 ENCOUNTER — CLINICAL SUPPORT (OUTPATIENT)
Dept: REHABILITATION | Facility: HOSPITAL | Age: 80
End: 2025-04-23
Payer: MEDICARE

## 2025-04-23 DIAGNOSIS — R27.8 COORDINATION ABNORMAL: ICD-10-CM

## 2025-04-23 DIAGNOSIS — R53.1 WEAKNESS: ICD-10-CM

## 2025-04-23 DIAGNOSIS — N39.3 SUI (STRESS URINARY INCONTINENCE), MALE: Primary | ICD-10-CM

## 2025-04-23 PROCEDURE — 97110 THERAPEUTIC EXERCISES: CPT | Mod: PN | Performed by: PHYSICAL THERAPIST

## 2025-04-23 PROCEDURE — 97112 NEUROMUSCULAR REEDUCATION: CPT | Mod: PN | Performed by: PHYSICAL THERAPIST

## 2025-04-23 NOTE — PROGRESS NOTES
Outpatient Rehab    Physical Therapy Progress Note    Patient Name: Hola Wiley  MRN: [id number]  YOB: 1945  Encounter Date: 4/23/2025    Therapy Diagnosis:   Encounter Diagnoses   Name Primary?    ROGE (stress urinary incontinence), male Yes    Weakness     Coordination abnormal      Physician: Trever Langley II, MD    Physician Orders: Eval and Treat  Medical Diagnosis: ROGE (stress urinary incontinence), male    Visit # 5/12 Visits Authorized:  4 / 10  Insurance Authorization Period: 3/21/2025 to 12/31/2025  Date of Evaluation: 3/27/2025   Plan of Care Certification: 3/27/2025 to 6/19/2025      PT/PTA:     Number of PTA visits since last PT visit:   Time In: 1307   Time Out: 1345  Total Time: 38   Total Billable Time:  38    FOTO:  Intake Score:  %  Survey Score 1:  %  Survey Score 2:  %         Subjective   Not wet at night. He is able to hold it to get to the bathroom. Going 2xs at night. 3 pads a day if doing work. Doing 2 pads a day if he is relaxing..  Pain reported as 0/10.      Objective           Treatment:  Therapeutic Exercise  TE 1: Scapular retraction 3lb each UE  TE 2: Chest press 3lb each UE  TE 3: rows 3lb each UE  TE 4: Abdominal scar massage  TE 5: Provided HEP  Balance/Neuromuscular Re-Education  NMR 5: Core coordination and awareness with management of IAP to assist with bladder control.    Time Entry(in minutes):  Neuromuscular Re-Education Time Entry: 25  Therapeutic Exercise Time Entry: 13    Assessment & Plan   Assessment: Noted UE weakness which could contribute to poor balance of abdominal pressure. Good understanding of PT recs.  Evaluation/Treatment Tolerance: Patient tolerated treatment well    Patient will continue to benefit from skilled outpatient physical therapy to address the deficits listed in the problem list box on initial evaluation, provide pt/family education and to maximize pt's level of independence in the home and community environment.     Patient's  spiritual, cultural, and educational needs considered and patient agreeable to plan of care and goals.           Plan: Discharge next visit if doing well. Decrease frequency to 1x every 2 weeks.    Goals:   Active       Long term goals:       Patient will deny urinary incontinence.  (Progressing)       Start:  03/28/25    Expected End:  06/19/25            Patient will be independent with progressive strength training.  (Progressing)       Start:  03/28/25    Expected End:  06/19/25            Patient will report improved tolerance for physical activities of choice due to improved bladder control.  (Progressing)       Start:  03/28/25    Expected End:  06/19/25              Resolved       Short term goals:       Patient will be independent with proper core activation.  (Met)       Start:  03/28/25    Expected End:  06/19/25    Resolved:  04/23/25         Patient will be independent with proper core mechanics with transfers.  (Met)       Start:  03/28/25    Expected End:  06/19/25    Resolved:  04/23/25         Patient will report using less than or equal to 2 pads per day due to improved bladder control.  (Met)       Start:  03/28/25    Expected End:  06/19/25    Resolved:  04/23/25             Gabino Tomas, PT

## 2025-05-12 ENCOUNTER — CLINICAL SUPPORT (OUTPATIENT)
Dept: REHABILITATION | Facility: HOSPITAL | Age: 80
End: 2025-05-12
Payer: MEDICARE

## 2025-05-12 DIAGNOSIS — R53.1 WEAKNESS: ICD-10-CM

## 2025-05-12 DIAGNOSIS — R27.8 COORDINATION ABNORMAL: ICD-10-CM

## 2025-05-12 DIAGNOSIS — N39.3 SUI (STRESS URINARY INCONTINENCE), MALE: Primary | ICD-10-CM

## 2025-05-12 PROCEDURE — 97110 THERAPEUTIC EXERCISES: CPT | Mod: PN | Performed by: PHYSICAL THERAPIST

## 2025-05-12 NOTE — PROGRESS NOTES
Outpatient Rehab    Physical Therapy Discharge    Patient Name: Hola Wiley  MRN: [id number]  YOB: 1945  Encounter Date: 5/12/2025    Therapy Diagnosis:   Encounter Diagnoses   Name Primary?    ROGE (stress urinary incontinence), male Yes    Coordination abnormal     Weakness      Physician: Trever Langley II, MD    Physician Orders: Eval and Treat  Medical Diagnosis: ROGE (stress urinary incontinence), male    Visit # 6/12 Visits Authorized:  5 / 10  Insurance Authorization Period: 3/21/2025 to 12/31/2025  Date of Evaluation: 3/27/2025  Plan of Care Certification: 3/28/2025 to 6/19/2025      PT/PTA:     Number of PTA visits since last PT visit:   Time In: 1141   Time Out: 1214  Total Time (in minutes): 33   Total Billable Time (in minutes):  33    FOTO:  Intake Score:  %  Survey Score 2:  %  Survey Score 3:  %    Precautions:       Subjective   3 pads a day if doing work. Doing 2 pads a day if he is relaxing. States that he is only going to the bathroom 1x at night. Morning leakage is not a lot. Doing his exercises at home. Feels like the exercises are really helping..  Pain reported as 0/10.      Objective            Treatment:  Therapeutic Exercise  TE 6: Bicep curls, wall push ups, chest press seated and supine, and shoulder abd - HEP provided      Time Entry(in minutes):  Therapeutic Exercise Time Entry: 33    Assessment & Plan   Assessment: Continue with progressive UE strengthening program. He is doing very well with bladder control       Patient's spiritual, cultural, and educational needs considered and patient agreeable to plan of care and goals.           Plan: Discharge PT at this time.    Goals:   Active       Long term goals:       Patient will deny urinary incontinence.  (Progressing)       Start:  03/28/25    Expected End:  06/19/25            Patient will be independent with progressive strength training.  (Met)       Start:  03/28/25    Expected End:  06/19/25    Resolved:   05/12/25         Patient will report improved tolerance for physical activities of choice due to improved bladder control.  (Met)       Start:  03/28/25    Expected End:  06/19/25    Resolved:  05/12/25           Resolved       Short term goals:       Patient will be independent with proper core activation.  (Met)       Start:  03/28/25    Expected End:  06/19/25    Resolved:  04/23/25         Patient will be independent with proper core mechanics with transfers.  (Met)       Start:  03/28/25    Expected End:  06/19/25    Resolved:  04/23/25         Patient will report using less than or equal to 2 pads per day due to improved bladder control.  (Met)       Start:  03/28/25    Expected End:  06/19/25    Resolved:  04/23/25             Gabino Tomas, PT

## 2025-07-10 PROBLEM — I51.7 RIGHT VENTRICULAR ENLARGEMENT: Status: ACTIVE | Noted: 2025-07-10

## 2025-07-10 PROBLEM — I50.20 HEART FAILURE WITH REDUCED EJECTION FRACTION, NYHA CLASS II: Status: ACTIVE | Noted: 2025-07-10

## 2025-07-28 ENCOUNTER — LAB VISIT (OUTPATIENT)
Dept: LAB | Facility: HOSPITAL | Age: 80
End: 2025-07-28
Attending: UROLOGY
Payer: MEDICARE

## 2025-07-28 DIAGNOSIS — Z85.46 HISTORY OF PROSTATE CANCER: ICD-10-CM

## 2025-07-28 LAB — PSA SERPL-MCNC: 0.03 NG/ML

## 2025-07-28 PROCEDURE — 36415 COLL VENOUS BLD VENIPUNCTURE: CPT

## 2025-07-28 PROCEDURE — 84153 ASSAY OF PSA TOTAL: CPT
